# Patient Record
Sex: FEMALE | Race: WHITE | NOT HISPANIC OR LATINO
[De-identification: names, ages, dates, MRNs, and addresses within clinical notes are randomized per-mention and may not be internally consistent; named-entity substitution may affect disease eponyms.]

---

## 2017-12-12 ENCOUNTER — ESTABLISHED (OUTPATIENT)
Age: 57
End: 2017-12-12

## 2017-12-12 DIAGNOSIS — H04.123: ICD-10-CM

## 2017-12-12 DIAGNOSIS — H33.103: ICD-10-CM

## 2017-12-12 DIAGNOSIS — H43.393: ICD-10-CM

## 2017-12-12 DIAGNOSIS — H18.59: ICD-10-CM

## 2017-12-12 PROCEDURE — 99214 OFFICE O/P EST MOD 30 MIN: CPT | Mod: 25

## 2017-12-12 PROCEDURE — 68761 CLOSE TEAR DUCT OPENING: CPT | Mod: 25

## 2017-12-12 PROCEDURE — A4262 TEMPORARY TEAR DUCT PLUG: HCPCS

## 2017-12-12 ASSESSMENT — TONOMETRY
OS_IOP_MMHG: 16
OD_IOP_MMHG: 16

## 2017-12-12 ASSESSMENT — VISUAL ACUITY
OD_CC: 20/30-2
OS_CC: 20/30

## 2018-04-04 ENCOUNTER — ESTABLISHED (OUTPATIENT)
Age: 58
End: 2018-04-04

## 2018-04-04 DIAGNOSIS — H33.103: ICD-10-CM

## 2018-04-04 DIAGNOSIS — H04.123: ICD-10-CM

## 2018-04-04 DIAGNOSIS — H43.393: ICD-10-CM

## 2018-04-04 DIAGNOSIS — H18.59: ICD-10-CM

## 2018-04-04 PROCEDURE — 99213 OFFICE O/P EST LOW 20 MIN: CPT

## 2018-04-04 PROCEDURE — A4263 PERMANENT TEAR DUCT PLUG: HCPCS

## 2018-04-04 PROCEDURE — 68761 CLOSE TEAR DUCT OPENING: CPT

## 2018-04-09 ENCOUNTER — ESTABLISHED (OUTPATIENT)
Age: 58
End: 2018-04-09

## 2018-04-09 DIAGNOSIS — H04.123: ICD-10-CM

## 2018-04-09 PROCEDURE — 99024 POSTOP FOLLOW-UP VISIT: CPT

## 2018-04-09 ASSESSMENT — VISUAL ACUITY
OS_CC: 20/30
OD_CC: 20/50

## 2018-04-18 ENCOUNTER — ESTABLISHED (OUTPATIENT)
Age: 58
End: 2018-04-18

## 2018-04-18 DIAGNOSIS — H04.123: ICD-10-CM

## 2018-04-18 PROCEDURE — A4262 TEMPORARY TEAR DUCT PLUG: HCPCS

## 2018-04-18 PROCEDURE — 68761 CLOSE TEAR DUCT OPENING: CPT | Mod: 51,E4

## 2018-04-18 ASSESSMENT — VISUAL ACUITY
OD_CC: 20/40
OS_CC: 20/30

## 2019-02-05 ENCOUNTER — APPOINTMENT (RX ONLY)
Dept: URBAN - METROPOLITAN AREA CLINIC 151 | Facility: CLINIC | Age: 59
Setting detail: DERMATOLOGY
End: 2019-02-05

## 2019-02-05 DIAGNOSIS — L30.9 DERMATITIS, UNSPECIFIED: ICD-10-CM

## 2019-02-05 PROBLEM — M12.9 ARTHROPATHY, UNSPECIFIED: Status: ACTIVE | Noted: 2019-02-05

## 2019-02-05 PROCEDURE — ? DIAGNOSIS COMMENT

## 2019-02-05 PROCEDURE — 99202 OFFICE O/P NEW SF 15 MIN: CPT

## 2019-02-05 PROCEDURE — ? PRESCRIPTION MEDICATION MANAGEMENT

## 2019-02-05 PROCEDURE — ? ADDITIONAL NOTES

## 2019-02-05 PROCEDURE — ? LAB REPORTS REVIEWED

## 2019-02-05 ASSESSMENT — SEVERITY ASSESSMENT: SEVERITY: MILD TO MODERATE

## 2019-02-05 ASSESSMENT — LOCATION DETAILED DESCRIPTION DERM: LOCATION DETAILED: PERIUMBILICAL SKIN

## 2019-02-05 ASSESSMENT — LOCATION SIMPLE DESCRIPTION DERM: LOCATION SIMPLE: ABDOMEN

## 2019-02-05 ASSESSMENT — LOCATION ZONE DERM: LOCATION ZONE: TRUNK

## 2019-02-05 NOTE — PROCEDURE: PRESCRIPTION MEDICATION MANAGEMENT
Detail Level: Zone
Render In Strict Bullet Format?: Yes
Initiate Treatment: Dr. Hutchison prescribed doxycycline hyclate 100 mg PO BID for 10 days and prednisone taper 40 mg x3 days, 35 x3 days, 30 x3 days, decreasing by increments of 5 mg every three days.

## 2019-02-05 NOTE — PROCEDURE: LAB REPORTS REVIEWED
Detail Level: Zone
Summarized Lab Results: Lyme AB with WB  (-)\\nQuant Gold (-)\\nRPR WNL\\nSed rate WNL\\nAFP WNL\\nUric acid + crystals \\nGram stain (-)

## 2019-02-05 NOTE — HPI: RASH
What Type Of Note Output Would You Prefer (Optional)?: Standard Output
How Severe Is Your Rash?: moderate
Is This A New Presentation, Or A Follow-Up?: Rash
Additional History: Presents with erythematous swollen left hand with overlying papules and pustules. Initial rash started off as red bumps that appeared 2/3/19. which was followed by pain and swelling over the following two days. Denies starting any abx. \\n\\nHx of inflammatory monoarthritis, Currently taking Celebrex 200 mg BID and Voltaren gel QD PRN.

## 2019-02-05 NOTE — PROCEDURE: ADDITIONAL NOTES
Additional Notes: Aerobic culture was ordered by Dr. Hutchison's office at today's visit to r/o bacterial infection.
Detail Level: Generalized

## 2019-02-05 NOTE — PROCEDURE: DIAGNOSIS COMMENT
Detail Level: Generalized
Comment: I am unsure of the etiology of the erythema and isolated pustules on her dorsal hand.  It appears to be improving at this time.

## 2019-04-29 ENCOUNTER — 1 YEAR COMPLETE EXAM (OUTPATIENT)
Age: 59
End: 2019-04-29

## 2019-04-29 DIAGNOSIS — H04.123: ICD-10-CM

## 2019-04-29 DIAGNOSIS — H18.59: ICD-10-CM

## 2019-04-29 DIAGNOSIS — H33.103: ICD-10-CM

## 2019-04-29 DIAGNOSIS — H43.393: ICD-10-CM

## 2019-04-29 PROCEDURE — 99214 OFFICE O/P EST MOD 30 MIN: CPT | Mod: 25

## 2019-04-29 PROCEDURE — A4262 TEMPORARY TEAR DUCT PLUG: HCPCS

## 2019-04-29 PROCEDURE — 68761 CLOSE TEAR DUCT OPENING: CPT

## 2019-04-29 ASSESSMENT — TONOMETRY
OS_IOP_MMHG: 14
OD_IOP_MMHG: 12

## 2019-04-29 ASSESSMENT — VISUAL ACUITY
OS_CC: 20/50+2
OD_CC: 20/30+1

## 2020-06-15 ENCOUNTER — DILATED DIABETIC EYE EXAM (OUTPATIENT)
Age: 60
End: 2020-06-15

## 2020-06-15 DIAGNOSIS — H04.123: ICD-10-CM

## 2020-06-15 DIAGNOSIS — H33.103: ICD-10-CM

## 2020-06-15 DIAGNOSIS — H18.59: ICD-10-CM

## 2020-06-15 DIAGNOSIS — H43.393: ICD-10-CM

## 2020-06-15 PROCEDURE — 92025 CPTRIZED CORNEAL TOPOGRAPHY: CPT

## 2020-06-15 PROCEDURE — 99214 OFFICE O/P EST MOD 30 MIN: CPT

## 2020-06-15 ASSESSMENT — KERATOMETRY
OD_K1POWER_DIOPTERS: 39.5
OS_AXISANGLE_DEGREES: 174
OD_AXISANGLE_DEGREES: 164
OD_K2POWER_DIOPTERS: 42.75
OS_K1POWER_DIOPTERS: 41.75
OS_AXISANGLE2_DEGREES: 84
OS_K2POWER_DIOPTERS: 43.5
OD_AXISANGLE2_DEGREES: 74

## 2020-06-15 ASSESSMENT — VISUAL ACUITY
OD_CC: 20/25
OS_CC: 20/30-2

## 2020-06-15 ASSESSMENT — TONOMETRY
OS_IOP_MMHG: 12
OD_IOP_MMHG: 15

## 2021-06-21 ENCOUNTER — DILATED DIABETIC EYE EXAM (OUTPATIENT)
Age: 61
End: 2021-06-21

## 2021-06-21 DIAGNOSIS — H18.593: ICD-10-CM

## 2021-06-21 DIAGNOSIS — E11.9: ICD-10-CM

## 2021-06-21 DIAGNOSIS — H43.393: ICD-10-CM

## 2021-06-21 DIAGNOSIS — H04.123: ICD-10-CM

## 2021-06-21 DIAGNOSIS — H33.103: ICD-10-CM

## 2021-06-21 PROCEDURE — 99214 OFFICE O/P EST MOD 30 MIN: CPT

## 2021-06-21 PROCEDURE — 92025 CPTRIZED CORNEAL TOPOGRAPHY: CPT

## 2021-06-21 ASSESSMENT — TONOMETRY
OD_IOP_MMHG: 15
OS_IOP_MMHG: 17

## 2021-06-21 ASSESSMENT — KERATOMETRY
OS_AXISANGLE_DEGREES: 179
OS_K2POWER_DIOPTERS: 43.25
OD_K1POWER_DIOPTERS: 39.25
OD_AXISANGLE_DEGREES: 169
OS_K1POWER_DIOPTERS: 41.75
OS_AXISANGLE2_DEGREES: 89
OD_K2POWER_DIOPTERS: 43.00
OD_AXISANGLE2_DEGREES: 79

## 2021-06-21 ASSESSMENT — VISUAL ACUITY
OD_CC: 20/25
OS_CC: 20/20

## 2022-06-22 ENCOUNTER — DILATED DIABETIC EYE EXAM (OUTPATIENT)
Age: 62
End: 2022-06-22

## 2022-06-22 DIAGNOSIS — H43.393: ICD-10-CM

## 2022-06-22 DIAGNOSIS — H18.593: ICD-10-CM

## 2022-06-22 DIAGNOSIS — H04.123: ICD-10-CM

## 2022-06-22 DIAGNOSIS — H33.103: ICD-10-CM

## 2022-06-22 DIAGNOSIS — E11.9: ICD-10-CM

## 2022-06-22 PROCEDURE — 99214 OFFICE O/P EST MOD 30 MIN: CPT

## 2022-06-22 PROCEDURE — 92025 CPTRIZED CORNEAL TOPOGRAPHY: CPT

## 2022-06-22 ASSESSMENT — KERATOMETRY
OD_K2POWER_DIOPTERS: 42.50
OS_AXISANGLE2_DEGREES: 88
OS_K1POWER_DIOPTERS: 41.75
OS_AXISANGLE_DEGREES: 178
OS_K2POWER_DIOPTERS: 43.25
OD_AXISANGLE2_DEGREES: 78
OD_AXISANGLE_DEGREES: 168
OD_K1POWER_DIOPTERS: 38.75

## 2022-06-22 ASSESSMENT — VISUAL ACUITY
OD_CC: 20/30
OS_CC: 20/30

## 2022-06-22 ASSESSMENT — TONOMETRY
OS_IOP_MMHG: 11
OD_IOP_MMHG: 09

## 2023-10-11 ENCOUNTER — ESTABLISHED COMPREHENSIVE EXAM (OUTPATIENT)
Dept: URBAN - METROPOLITAN AREA CLINIC 71 | Facility: CLINIC | Age: 63
End: 2023-10-11

## 2023-10-11 DIAGNOSIS — H04.123: ICD-10-CM

## 2023-10-11 DIAGNOSIS — H18.593: ICD-10-CM

## 2023-10-11 DIAGNOSIS — E11.9: ICD-10-CM

## 2023-10-11 PROCEDURE — 92014 COMPRE OPH EXAM EST PT 1/>: CPT

## 2023-10-11 ASSESSMENT — VISUAL ACUITY
OS_CC: 20/30+1
OD_CC: 20/30+1

## 2024-10-16 ENCOUNTER — ESTABLISHED COMPREHENSIVE EXAM (OUTPATIENT)
Dept: URBAN - METROPOLITAN AREA CLINIC 71 | Facility: CLINIC | Age: 64
End: 2024-10-16

## 2024-10-16 DIAGNOSIS — H25.13: ICD-10-CM

## 2024-10-16 DIAGNOSIS — H18.593: ICD-10-CM

## 2024-10-16 DIAGNOSIS — H33.103: ICD-10-CM

## 2024-10-16 DIAGNOSIS — E11.9: ICD-10-CM

## 2024-10-16 DIAGNOSIS — H43.393: ICD-10-CM

## 2024-10-16 DIAGNOSIS — H04.123: ICD-10-CM

## 2024-10-16 PROCEDURE — 92014 COMPRE OPH EXAM EST PT 1/>: CPT

## 2024-10-16 ASSESSMENT — KERATOMETRY
OS_K1POWER_DIOPTERS: 42.25
OS_AXISANGLE_DEGREES: 179
OS_AXISANGLE2_DEGREES: 89
OD_K1POWER_DIOPTERS: 39.50
OD_AXISANGLE_DEGREES: 163
OD_K2POWER_DIOPTERS: 43.25
OD_AXISANGLE2_DEGREES: 73
OS_K2POWER_DIOPTERS: 43.50

## 2024-10-16 ASSESSMENT — VISUAL ACUITY
OS_CC: 20/30+2
OD_CC: 20/25+2